# Patient Record
Sex: FEMALE | Race: WHITE | NOT HISPANIC OR LATINO
[De-identification: names, ages, dates, MRNs, and addresses within clinical notes are randomized per-mention and may not be internally consistent; named-entity substitution may affect disease eponyms.]

---

## 2019-05-14 ENCOUNTER — APPOINTMENT (OUTPATIENT)
Age: 44
End: 2019-05-14
Payer: COMMERCIAL

## 2019-05-14 ENCOUNTER — RESULT CHARGE (OUTPATIENT)
Age: 44
End: 2019-05-14

## 2019-05-14 VITALS
HEIGHT: 60 IN | BODY MASS INDEX: 32.2 KG/M2 | DIASTOLIC BLOOD PRESSURE: 74 MMHG | SYSTOLIC BLOOD PRESSURE: 114 MMHG | WEIGHT: 164 LBS

## 2019-05-14 DIAGNOSIS — Z86.39 PERSONAL HISTORY OF OTHER ENDOCRINE, NUTRITIONAL AND METABOLIC DISEASE: ICD-10-CM

## 2019-05-14 DIAGNOSIS — Z82.49 FAMILY HISTORY OF ISCHEMIC HEART DISEASE AND OTHER DISEASES OF THE CIRCULATORY SYSTEM: ICD-10-CM

## 2019-05-14 DIAGNOSIS — N30.90 CYSTITIS, UNSPECIFIED W/OUT HEMATURIA: ICD-10-CM

## 2019-05-14 DIAGNOSIS — Z84.1 FAMILY HISTORY OF DISORDERS OF KIDNEY AND URETER: ICD-10-CM

## 2019-05-14 DIAGNOSIS — R32 UNSPECIFIED URINARY INCONTINENCE: ICD-10-CM

## 2019-05-14 DIAGNOSIS — Z80.3 FAMILY HISTORY OF MALIGNANT NEOPLASM OF BREAST: ICD-10-CM

## 2019-05-14 DIAGNOSIS — R39.15 URGENCY OF URINATION: ICD-10-CM

## 2019-05-14 DIAGNOSIS — Z83.3 FAMILY HISTORY OF DIABETES MELLITUS: ICD-10-CM

## 2019-05-14 DIAGNOSIS — Z86.018 PERSONAL HISTORY OF OTHER BENIGN NEOPLASM: ICD-10-CM

## 2019-05-14 LAB
BILIRUB UR QL STRIP: NEGATIVE
CLARITY UR: CLEAR
COLLECTION METHOD: NORMAL
GLUCOSE UR-MCNC: 2
HCG UR QL: 0.2 EU/DL
HGB UR QL STRIP.AUTO: NEGATIVE
KETONES UR-MCNC: NEGATIVE
LEUKOCYTE ESTERASE UR QL STRIP: NEGATIVE
NITRITE UR QL STRIP: NEGATIVE
PH UR STRIP: 6
PROT UR STRIP-MCNC: NEGATIVE
SP GR UR STRIP: 1

## 2019-05-14 PROCEDURE — 99204 OFFICE O/P NEW MOD 45 MIN: CPT | Mod: 25

## 2019-05-14 PROCEDURE — 51701 INSERT BLADDER CATHETER: CPT

## 2019-05-14 PROCEDURE — 81003 URINALYSIS AUTO W/O SCOPE: CPT | Mod: QW

## 2019-05-14 RX ORDER — OXYBUTYNIN CHLORIDE 10 MG/1
10 TABLET, EXTENDED RELEASE ORAL
Qty: 30 | Refills: 1 | Status: ACTIVE | COMMUNITY
Start: 2019-05-14 | End: 1900-01-01

## 2019-05-14 NOTE — OB HISTORY
[Vaginal ___] : [unfilled] vaginal delivery(s) [Sexually Active] : is not sexually active [FreeTextEntry1] : largest baby 8 lbs 15 oz

## 2019-05-14 NOTE — CHIEF COMPLAINT
[Questionnaire Received] : Patient questionnaire received [Poor Bladder Control] : poor bladder control [Recurrent Urinary Infections] : recurrent urinary infections [Sexual Dysfunction] : sexual dysfunction

## 2019-05-14 NOTE — PHYSICAL EXAM
[Oriented x3] : oriented to person, place, and time [No Acute Distress] : in no acute distress [No Edema] : ~T edema was not present [Symmetrical] : the neck was ~L symmetrical [Warm and Dry] : was warm and dry to touch [Normal Gait] : gait was normal [Labia Majora] : were normal [Labia Minora] : were normal [Bartholin's Gland] : both Bartholin's glands were normal  [Normal Appearance] : general appearance was normal [2] : 2 [Aa ____] : Aa [unfilled] [Ba ____] : Ba [unfilled] [C ____] : C [unfilled] [GH ____] : GH [unfilled] [PB ____] : PB [unfilled] [TVL ____] : TVL  [unfilled] [Ap ____] : Ap [unfilled] [Bp ____] : Bp [unfilled] [D ____] : D [unfilled] [] : I [Normal] : normal [Soft] :  the cervix was soft [Post Void Residual ____ml] : post void residual via catheterization was [unfilled] ml [Exam Deferred] : was deferred [Cough] : no cough [Tenderness] : ~T no ~M abdominal tenderness observed [Distended] : not distended [FreeTextEntry3] : CST neg, +urethral hypermobility [Inguinal LAD] : no adenopathy was noted in the inguinal lymph nodes [FreeTextEntry4] : +menses, 1cm just left of midline distal redundant tissue vs simple appearing vaginal cyst, tender, no urethral discharge with squeeze [de-identified] : no appreciable masses [de-identified] : uroflow low volume void for interpretation

## 2019-05-14 NOTE — ASSESSMENT
[FreeTextEntry1] : Patient requested official  via [employer] for discussion - Lorena, #[phone] used.\par \par Susan is a pleasant 45 yo P4, PSHx includes BTL and C/S x 1 (for IUFD at term?), presents with reported hx of recurrent UTIs, OAB-wet and TAYLOR, and dyspareunia. On exam, her empty supine CST was neg, however she had positive urethral hypermobility. Her straight-cath PVR volume was elevated and neg for evidence of UTI, however there was glucose present which she was counseled represents poor DM control and should discuss with PMD. On pelvic exam, there were no appreciable masses or lesions. Along the distal anterior vaginal wall just left of midline suburethrally was a 1cm redundant epithelium vs small vaginal cyst present, she reported some tenderness diffusefly throughout the vagina however. Pelvic floor muscle contraction strength was present but weak. There was no levator or pelvic floor musculature banding, tightness, or point tenderness. POPQ exam did not demonstrate clinically significant pelvic organ prolapse.\par \par We reviewed TAYLOR vs OAB. The etiology of TAYLOR was discussed. Management options including observation, behavioral modifications, medication, pessary, Impressa insert, periurethral bulking via cystoscopy, and surgery with midurethral sling were reviewed. The etiology of OAB was discussed. Management options including observation, behavioral modifications (dietary changes, monitoring fluid intake, bladder training, timed voids, use of pads/protective garments), kegels, PT, medications, PTNS, SNS, and bladder Botox were all reviewed. She will try behavioral modifications - reducing dietary irritants, and Oxybutynin 10mg. SEs such as dry mouth and constipation reviewed. She will RTO in 1 month for PVR check and followup. If no response, I would recommend UDS to further eval JILLIAN at that time.\par \par We discussed reduced libido vs vaginal pathology that may lead to sexual dysfunction. Potential cyst discussed and I would like her to think about where her dyspareunia is and reexamine at next visit. Cysto to be considered to r/o urethral diverticulum component. She does not feel dry. I would like to recheck POPQ as well to look for cystocele component considering her symptoms and PVR of 100ml today. She understood and agreed. All questions answered.\par \par Plan:\par [] OAB behavioral modifications, avoid dietary irritants, total fluid amount normal, start oxybutynin 10mg\par [] recheck PVR and POPQ at next visit\par [] consider cysto (eval suburethral cyst) and UDS in future

## 2019-05-14 NOTE — PROCEDURE
[Straight Catheterization] : insertion of a straight catheter [Urinary Tract Infection] : a urinary tract infection [Urinary Retention] : urinary retention [Stress Incontinence] : stress incontinence [Urgent Incontinence] : urgent incontinence [Urinary Frequency] : urinary frequency [Patient] : the patient [___ Fr Straight Tip] : a [unfilled] in [place] straight tip catheter [None] : there were no complications with the catheter insertion [No Complications] : no complications [Clear] : clear [Post procedure instructions and information given] : Post procedure instructions and information were given and reviewed with patient. [Tolerated Well] : the patient tolerated the procedure well [0] : 0 [FreeTextEntry1] : catheterized to obtain uncontaminated specimen due to menses

## 2019-06-11 ENCOUNTER — APPOINTMENT (OUTPATIENT)
Dept: UROGYNECOLOGY | Facility: CLINIC | Age: 44
End: 2019-06-11

## 2020-04-04 ENCOUNTER — TRANSCRIPTION ENCOUNTER (OUTPATIENT)
Age: 45
End: 2020-04-04

## 2021-12-12 ENCOUNTER — EMERGENCY (EMERGENCY)
Facility: HOSPITAL | Age: 46
LOS: 1 days | Discharge: HOME | End: 2021-12-12
Attending: EMERGENCY MEDICINE
Payer: COMMERCIAL

## 2021-12-12 VITALS
OXYGEN SATURATION: 96 % | WEIGHT: 111.99 LBS | TEMPERATURE: 100 F | DIASTOLIC BLOOD PRESSURE: 84 MMHG | SYSTOLIC BLOOD PRESSURE: 128 MMHG | HEIGHT: 64 IN | HEART RATE: 115 BPM | RESPIRATION RATE: 20 BRPM

## 2021-12-12 LAB
ANION GAP SERPL CALC-SCNC: 14 MMOL/L — SIGNIFICANT CHANGE UP (ref 5–17)
BUN SERPL-MCNC: 10 MG/DL — SIGNIFICANT CHANGE UP (ref 8–20)
CALCIUM SERPL-MCNC: 8.4 MG/DL — LOW (ref 8.6–10.2)
CHLORIDE SERPL-SCNC: 97 MMOL/L — LOW (ref 98–107)
CO2 SERPL-SCNC: 22 MMOL/L — SIGNIFICANT CHANGE UP (ref 22–29)
CREAT SERPL-MCNC: 0.33 MG/DL — LOW (ref 0.5–1.3)
GLUCOSE SERPL-MCNC: 266 MG/DL — HIGH (ref 70–99)
HCT VFR BLD CALC: 43 % — SIGNIFICANT CHANGE UP (ref 34.5–45)
HGB BLD-MCNC: 14.7 G/DL — SIGNIFICANT CHANGE UP (ref 11.5–15.5)
HPIV3 RNA SPEC QL NAA+PROBE: DETECTED
MCHC RBC-ENTMCNC: 29.3 PG — SIGNIFICANT CHANGE UP (ref 27–34)
MCHC RBC-ENTMCNC: 34.2 GM/DL — SIGNIFICANT CHANGE UP (ref 32–36)
MCV RBC AUTO: 85.8 FL — SIGNIFICANT CHANGE UP (ref 80–100)
PLATELET # BLD AUTO: 295 K/UL — SIGNIFICANT CHANGE UP (ref 150–400)
POTASSIUM SERPL-MCNC: 4.3 MMOL/L — SIGNIFICANT CHANGE UP (ref 3.5–5.3)
POTASSIUM SERPL-SCNC: 4.3 MMOL/L — SIGNIFICANT CHANGE UP (ref 3.5–5.3)
RAPID RVP RESULT: DETECTED
RBC # BLD: 5.01 M/UL — SIGNIFICANT CHANGE UP (ref 3.8–5.2)
RBC # FLD: 12.3 % — SIGNIFICANT CHANGE UP (ref 10.3–14.5)
SARS-COV-2 RNA SPEC QL NAA+PROBE: SIGNIFICANT CHANGE UP
SODIUM SERPL-SCNC: 133 MMOL/L — LOW (ref 135–145)
WBC # BLD: 7.49 K/UL — SIGNIFICANT CHANGE UP (ref 3.8–10.5)
WBC # FLD AUTO: 7.49 K/UL — SIGNIFICANT CHANGE UP (ref 3.8–10.5)

## 2021-12-12 PROCEDURE — 36415 COLL VENOUS BLD VENIPUNCTURE: CPT

## 2021-12-12 PROCEDURE — 71046 X-RAY EXAM CHEST 2 VIEWS: CPT | Mod: 26

## 2021-12-12 PROCEDURE — 71046 X-RAY EXAM CHEST 2 VIEWS: CPT

## 2021-12-12 PROCEDURE — 96374 THER/PROPH/DIAG INJ IV PUSH: CPT

## 2021-12-12 PROCEDURE — 99284 EMERGENCY DEPT VISIT MOD MDM: CPT | Mod: 25

## 2021-12-12 PROCEDURE — 80048 BASIC METABOLIC PNL TOTAL CA: CPT

## 2021-12-12 PROCEDURE — 85027 COMPLETE CBC AUTOMATED: CPT

## 2021-12-12 PROCEDURE — 0225U NFCT DS DNA&RNA 21 SARSCOV2: CPT

## 2021-12-12 PROCEDURE — 99284 EMERGENCY DEPT VISIT MOD MDM: CPT

## 2021-12-12 RX ORDER — ACETAMINOPHEN 500 MG
650 TABLET ORAL ONCE
Refills: 0 | Status: COMPLETED | OUTPATIENT
Start: 2021-12-12 | End: 2021-12-12

## 2021-12-12 RX ORDER — SODIUM CHLORIDE 9 MG/ML
1000 INJECTION INTRAMUSCULAR; INTRAVENOUS; SUBCUTANEOUS ONCE
Refills: 0 | Status: COMPLETED | OUTPATIENT
Start: 2021-12-12 | End: 2021-12-12

## 2021-12-12 RX ORDER — DEXAMETHASONE 0.5 MG/5ML
10 ELIXIR ORAL ONCE
Refills: 0 | Status: COMPLETED | OUTPATIENT
Start: 2021-12-12 | End: 2021-12-12

## 2021-12-12 RX ADMIN — SODIUM CHLORIDE 1000 MILLILITER(S): 9 INJECTION INTRAMUSCULAR; INTRAVENOUS; SUBCUTANEOUS at 09:52

## 2021-12-12 RX ADMIN — Medication 650 MILLIGRAM(S): at 10:04

## 2021-12-12 RX ADMIN — Medication 102 MILLIGRAM(S): at 10:03

## 2021-12-12 RX ADMIN — Medication 100 MILLIGRAM(S): at 10:04

## 2021-12-12 NOTE — ED PROVIDER NOTE - ATTENDING CONTRIBUTION TO CARE
The patient seen and examined    Edwardo STARKEY, performed the initial face to face bedside interview with this patient regarding history of present illness, review of symptoms and relevant past medical, social and family history.  I completed an independent physical examination.  I was the initial provider who evaluated this patient. I have signed out the follow up of any pending tests (i.e. labs, radiological studies) to the ACP.  I have communicated the patient’s plan of care and disposition with the ACP.

## 2021-12-12 NOTE — ED PROVIDER NOTE - PHYSICAL EXAMINATION
PE- Well developed, well-nourish, resting comfortably in NAD.   Cardiac- + regular rate and rhythm.   Pulm- lungs clear bilaterally. One time wheeze of RUL which improved with cough. No distress.  Abd soft and non-tender. No guarding or rigidity.   Neuro- A&Ox3, no gross sensory deficits to light touch or motor weaknesses.   Vasc- No peripheral edema or venous stasis noted.  Skin- No ecchymosis or bleeding.  MS-  + NROM. Non-tender. No signs of trauma.

## 2021-12-12 NOTE — ED PROVIDER NOTE - CLINICAL SUMMARY MEDICAL DECISION MAKING FREE TEXT BOX
Will obtain CXR and labs. With flu and COVID testing negative in the pats 24 hrs, patient likely has viral cough. will hydrate and re-evaluate.

## 2021-12-12 NOTE — ED ADULT NURSE NOTE - OBJECTIVE STATEMENT
46 yof presents to ed with cough sore throat and pleuritic pain from coughing. denies fever/ c/o chills. body aches

## 2021-12-12 NOTE — ED PROVIDER NOTE - OBJECTIVE STATEMENT
Patient presented with son for evaluation of cough of chest and back discomfort. She has had a cough for a about one week. She was seen in the ED at [facility] yesterday and was tested for flu and COVID. Both were negative. The patient reports that she did not have any other testing. Patient reports of being COVID vaccinated. No flu vaccine yet. She reports of fatigue and HA with complaints. No fever or chills. No nausea or vomiting.

## 2021-12-12 NOTE — ED PROVIDER NOTE - NS ED ROS FT
Review of Systems-  Constitutional: No fever or chills.   Cardiovascular: No orthopnea or chest pain  Pulmonary: + cough No shortness of breath.   GI: No abdominal pain, nausea or vomiting  Musculoskeletal: No joint pain. No neck pain   Psychiatric: No anxiety and depression.

## 2021-12-12 NOTE — ED PROVIDER NOTE - PATIENT PORTAL LINK FT
You can access the FollowMyHealth Patient Portal offered by [facility] by registering at the following website: [email or web address]. By joining [employer]’s FollowMyHealth portal, you will also be able to view your health information using other applications (apps) compatible with our system.

## 2021-12-12 NOTE — ED PROVIDER NOTE - NSFOLLOWUPINSTRUCTIONS_ED_ALL_ED_FT
* TAKE ALL MEDICATIONS as directed.  ** Tessalon Pearles as directed **  * FOR PAIN YOU CAN TAKE IBUPROFEN (MOTRIN, ADVIL) OR ACETAMINOPHEN (TYLENOL) AS NEEDED, AS DIRECTED ON PACKAGING.  ** Ibuprofen can cause stomach discomfort. Discontinue immediately if this should develop.  * IF NEEDED, CALL [phone] TO FIND A PRIMARY CARE PHYSICIAN.  OR CALL [phone] TO MAKE AN APPOINTMENT WITH THE MEDICAL CLINIC.  *  RETURN TO THE ER FOR ANY WORSENING SYMPTOMS.    * Follow-up with primary care provider within 2-3 days for re-evaluation  * Reduce activities until improved.   * Increase daily water intake

## 2021-12-14 ENCOUNTER — TRANSCRIPTION ENCOUNTER (OUTPATIENT)
Age: 46
End: 2021-12-14

## 2022-04-05 PROBLEM — E11.9 TYPE 2 DIABETES MELLITUS WITHOUT COMPLICATIONS: Chronic | Status: ACTIVE | Noted: 2021-12-12

## 2022-06-06 ENCOUNTER — NON-APPOINTMENT (OUTPATIENT)
Age: 47
End: 2022-06-06

## 2022-07-07 NOTE — HISTORY OF PRESENT ILLNESS
[FreeTextEntry1] : CPE/new patient [de-identified] : Susan is a 47-year-old female with past medical history of

## 2022-07-08 ENCOUNTER — APPOINTMENT (OUTPATIENT)
Dept: INTERNAL MEDICINE | Facility: CLINIC | Age: 47
End: 2022-07-08

## 2023-04-13 ENCOUNTER — APPOINTMENT (OUTPATIENT)
Dept: GASTROENTEROLOGY | Facility: CLINIC | Age: 48
End: 2023-04-13
Payer: COMMERCIAL

## 2023-04-13 VITALS — HEART RATE: 98 BPM

## 2023-04-13 VITALS
BODY MASS INDEX: 28.13 KG/M2 | HEART RATE: 102 BPM | OXYGEN SATURATION: 98 % | HEIGHT: 61 IN | DIASTOLIC BLOOD PRESSURE: 90 MMHG | TEMPERATURE: 96.8 F | WEIGHT: 149 LBS | SYSTOLIC BLOOD PRESSURE: 139 MMHG

## 2023-04-13 DIAGNOSIS — K31.A0 GASTRIC INTESTINAL METAPLASIA, UNSPECIFIED: ICD-10-CM

## 2023-04-13 DIAGNOSIS — Z78.9 OTHER SPECIFIED HEALTH STATUS: ICD-10-CM

## 2023-04-13 PROCEDURE — 99204 OFFICE O/P NEW MOD 45 MIN: CPT

## 2023-04-13 NOTE — HISTORY OF PRESENT ILLNESS
[FreeTextEntry1] : 48 year old woman with hx of DM presents for second opinion.\par \par Patient reports she has had 9 months of left sided abdominal pain. Reports pain is constant, non-radiating and partially improved with a 'pill' that she was given. She underwent an endoscopy and colonoscopy with an outside provider. Colonoscopy was reportedly normal, but endoscopy showed gastric intestinal metaplasia (Unclear distribution). Patient denies fevers, chills, chest pain, SOB, nausea, vomiting, melena, hematochezia, hematemesis, dysphagia, odynophagia, headache, dizziness, abdominal pain and recent travel. \par \par Patient reports she is scheduled for a repeat endoscopy with further biopsies.

## 2023-04-13 NOTE — ASSESSMENT
[FreeTextEntry1] : Impression:\par # Gastric Intestinal Metaplasia\par \par Plan:\par - Advised for repeat endoscopy with mapping biopsies (reports this is already scheduled with outside provider)\par - Educated on details and natural history of gastric intestinal metaplasia.\par - Follow up PRN.

## 2023-04-13 NOTE — PHYSICAL EXAM
[Alert] : alert [Normal Voice/Communication] : normal voice/communication [Healthy Appearing] : healthy appearing [No Acute Distress] : no acute distress [Sclera] : the sclera and conjunctiva were normal [Hearing Threshold Finger Rub Not [place]] : hearing was normal [Normal Appearance] : the appearance of the neck was normal [No Respiratory Distress] : no respiratory distress [No Acc Muscle Use] : no accessory muscle use [Respiration, Rhythm And Depth] : normal respiratory rhythm and effort [Heart Rate And Rhythm] : heart rate was normal and rhythm regular [Normal S1, S2] : normal S1 and S2 [Murmurs] : no murmurs [Bowel Sounds] : normal bowel sounds [Abdomen Tenderness] : non-tender [No Masses] : no abdominal mass palpated [Abdomen Soft] : soft [Cervical Lymph Nodes Enlarged Posterior Bilaterally] : no posterior cervical lymphadenopathy [Cervical Lymph Nodes Enlarged Anterior Bilaterally] : no anterior cervical lymphadenopathy [No CVA Tenderness] : no CVA  tenderness [No Spinal Tenderness] : no spinal tenderness [Abnormal Walk] : normal gait [Involuntary Movements] : no involuntary movements were seen [Normal Color / Pigmentation] : normal skin color and pigmentation [Skin Lesions] : no skin lesions [No Focal Deficits] : no focal deficits [Motor Exam] : the motor exam was normal [Oriented To Time, Place, And Person] : oriented to person, place, and time [Normal Affect] : the affect was normal [Normal Mood] : the mood was normal

## 2023-04-13 NOTE — REVIEW OF SYSTEMS
[As Noted in HPI] : as noted in HPI [Feeling Poorly] : not feeling poorly [Feeling Tired] : not feeling tired [Red Eyes] : eyes not red [Scleral Icterus (Yellow Eyes)] : no scleral icterus [Sore Throat] : no sore throat [Hoarseness] : no hoarseness [Chest Pain] : no chest pain [Palpitations] : no palpitations [Lower Ext Edema (lower leg swelling)] : no lower extremity edema [Wheezing] : no wheezing [SOB on Exertion] : no shortness of breath during exertion [Arthralgias (joint pain)] : no arthralgias [Joint Stiffness] : no joint stiffness [Itching] : no itching [Jaundice (yellowing of skin)] : no jaundice [Dizziness] : no dizziness [Fainting] : no fainting [Suicidal] : not suicidal [Anxiety] : no anxiety [Easy Bleeding] : no tendency for easy bleeding [Easy Bruising] : no tendency for easy bruising

## 2023-07-31 ENCOUNTER — APPOINTMENT (OUTPATIENT)
Dept: NEUROSURGERY | Facility: CLINIC | Age: 48
End: 2023-07-31
Payer: COMMERCIAL

## 2023-07-31 ENCOUNTER — NON-APPOINTMENT (OUTPATIENT)
Age: 48
End: 2023-07-31

## 2023-07-31 VITALS
BODY MASS INDEX: 27.94 KG/M2 | SYSTOLIC BLOOD PRESSURE: 134 MMHG | OXYGEN SATURATION: 99 % | HEIGHT: 61 IN | HEART RATE: 92 BPM | DIASTOLIC BLOOD PRESSURE: 77 MMHG | WEIGHT: 148 LBS

## 2023-07-31 DIAGNOSIS — G89.29 LOW BACK PAIN, UNSPECIFIED: ICD-10-CM

## 2023-07-31 DIAGNOSIS — E78.00 PURE HYPERCHOLESTEROLEMIA, UNSPECIFIED: ICD-10-CM

## 2023-07-31 DIAGNOSIS — M54.50 LOW BACK PAIN, UNSPECIFIED: ICD-10-CM

## 2023-07-31 DIAGNOSIS — E11.9 TYPE 2 DIABETES MELLITUS W/OUT COMPLICATIONS: ICD-10-CM

## 2023-07-31 DIAGNOSIS — M54.16 RADICULOPATHY, LUMBAR REGION: ICD-10-CM

## 2023-07-31 PROCEDURE — 99203 OFFICE O/P NEW LOW 30 MIN: CPT

## 2023-07-31 RX ORDER — GABAPENTIN 300 MG/1
300 CAPSULE ORAL
Qty: 90 | Refills: 1 | Status: ACTIVE | COMMUNITY
Start: 2023-07-31 | End: 1900-01-01

## 2023-08-01 ENCOUNTER — APPOINTMENT (OUTPATIENT)
Dept: MRI IMAGING | Facility: CLINIC | Age: 48
End: 2023-08-01

## 2023-08-05 NOTE — ASSESSMENT
[FreeTextEntry1] : Ms. VIVIAN SWIFT is presenting Lumbar Spine The recommendation is for the following: Imaging evaluation shows evidence of   Plan: MRI Pelvis to the evaluation of sacral lesion/ pathology Hip  Xray to R/O Osteoarthritis Begin Physical Therapy for core strengthening.   Follow up after studies are completed. Please see Dr. Valdivia's dictation for details. I, Dr. Oneida Valdivia evaluated the patient with the nurse practitioner Harlan Huizar and established the plan of care. I personally discuss this patient with the nurse practitioner at the time of the visit. I agree with the assessment and plan as written, unless noted below.

## 2023-08-05 NOTE — HISTORY OF PRESENT ILLNESS
[FreeTextEntry1] : lower back pain [de-identified] : Ms. VIVIAN SWIFT is a 48-year woman presenting with a PMHx of DM, High Cholesterol   who presents for comprehensive neurosurgical evaluation of lumbar spine. Today she reports exacerbation of Right leg pain over the past three months. She reports that the pain is centrally located in the lowerback. She denies any numbness and tingling but reports right leg weakness. She says h/o falls over the past 2 years. She has had Ibuprofen and Lidocaine patches with minimal relief.

## 2023-08-21 ENCOUNTER — APPOINTMENT (OUTPATIENT)
Dept: NEUROSURGERY | Facility: CLINIC | Age: 48
End: 2023-08-21

## 2023-11-24 NOTE — HISTORY OF PRESENT ILLNESS
Flomax 0.4 mg oral capsule: 1 cap(s) orally once a day  omeprazole 20 mg oral delayed release capsule: orally 3 times a week  potassium citrate 5 mEq oral tablet, extended release: 1 tab(s) orally 2 times a day  rosuvastatin 5 mg oral tablet: 0.5  orally 3 times a week   [FreeTextEntry1] : About 1 year of bothersome urinary complaints - freq and urge, during day, UUI > TAYLOR. No hematuria, occasional dysuria, +incomplete emptying. NO bulge or pressure from vagina, however dyspareunia - unsure if deep vs insertional. Reports recurernt UTIs few/year, for 1-2 years, occasional dysuria or incomplete emptying but not UCx-proven, and not related to intercourse, symptoms do NOT respond to abx treatment. Regular monthly menses. Hx BTL, future fertility not desired, currently has IUD in for fibroid uterus / reeduction of heavy menses which helps. Normal bowel movements without straining. No pain. Reports avg fluid intake about 32 oz water and 20 oz coffee per day. Same sexual partner - , reduced libido for years. No DV, no trauma.\par \par Diabetic, likely not controlled, reports doesn't take her metformin routinely and FSG sometimes normal  and sometimes in 300s. apixaban 5 mg oral tablet: 2 tab(s) orally 2 times a day 2 tabs by mouth twice a day for 7 days, then DECREASE dose to 1 tab by mouth twice a day starting evening dose of 12/1/23.  Flomax 0.4 mg oral capsule: 1 cap(s) orally once a day  omeprazole 20 mg oral delayed release capsule: orally 3 times a week  potassium citrate 5 mEq oral tablet, extended release: 1 tab(s) orally 2 times a day  rosuvastatin 5 mg oral tablet: 0.5  orally 3 times a week

## 2024-02-14 ENCOUNTER — APPOINTMENT (OUTPATIENT)
Dept: OBGYN | Facility: CLINIC | Age: 49
End: 2024-02-14
Payer: COMMERCIAL

## 2024-02-14 VITALS
BODY MASS INDEX: 29.25 KG/M2 | DIASTOLIC BLOOD PRESSURE: 72 MMHG | SYSTOLIC BLOOD PRESSURE: 120 MMHG | WEIGHT: 149 LBS | HEIGHT: 60 IN

## 2024-02-14 DIAGNOSIS — R10.2 PELVIC AND PERINEAL PAIN: ICD-10-CM

## 2024-02-14 DIAGNOSIS — Z01.411 ENCOUNTER FOR GYNECOLOGICAL EXAMINATION (GENERAL) (ROUTINE) WITH ABNORMAL FINDINGS: ICD-10-CM

## 2024-02-14 PROCEDURE — 99386 PREV VISIT NEW AGE 40-64: CPT

## 2024-02-14 NOTE — PLAN
[FreeTextEntry1] : Encounter for GYN exam   - PAP obtained   Female pelvic pain   - US ordered   F/U in 2 weeks to discuss results  All questions and concerns addressed during encounter. Pt. agreed to plan of care.

## 2024-02-14 NOTE — HISTORY OF PRESENT ILLNESS
[N] : Patient is not sexually active [Regular Cycle Intervals] : periods have been regular [Frequency: Q ___ days] : menstrual periods occur approximately every [unfilled] days [Menarche Age: ____] : age at menarche was [unfilled] [Y] : Patient uses contraception [FreeTextEntry1] : 49 year old female presents for annual examination. Pt. sates she has very heavy menstrual cycles that are painful. but states they are regular endorses history of uterine fibroids, complains of pulsating pelvic pain. . No complaints of hot flashes. Pt. had mammogram performed today.  [PGHxTotal] : 5 [[facility] : 4 [PGHxPremature] : 0 [PGHxAbortions] : 1 [[facility]] : 4 [PGHxABInduced] : 0 [PGHxABSpont] : 1 [PGHxEctopic] : 0 [PGHxMultBirths] : 0

## 2024-02-16 LAB — HPV HIGH+LOW RISK DNA PNL CVX: NOT DETECTED

## 2024-02-16 RX ORDER — METFORMIN HYDROCHLORIDE 500 MG/1
500 TABLET, COATED ORAL
Refills: 0 | Status: ACTIVE | COMMUNITY

## 2024-02-16 RX ORDER — GLIPIZIDE 2.5 MG/1
TABLET ORAL
Refills: 0 | Status: ACTIVE | COMMUNITY

## 2024-02-16 RX ORDER — ATORVASTATIN CALCIUM 80 MG/1
TABLET, FILM COATED ORAL
Refills: 0 | Status: ACTIVE | COMMUNITY

## 2024-02-20 LAB — CYTOLOGY CVX/VAG DOC THIN PREP: NORMAL

## 2024-02-27 ENCOUNTER — APPOINTMENT (OUTPATIENT)
Dept: ULTRASOUND IMAGING | Facility: CLINIC | Age: 49
End: 2024-02-27
Payer: COMMERCIAL

## 2024-02-27 ENCOUNTER — OUTPATIENT (OUTPATIENT)
Dept: OUTPATIENT SERVICES | Facility: HOSPITAL | Age: 49
LOS: 1 days | End: 2024-02-27

## 2024-02-27 PROCEDURE — 76830 TRANSVAGINAL US NON-OB: CPT | Mod: 26

## 2024-02-27 PROCEDURE — 76856 US EXAM PELVIC COMPLETE: CPT | Mod: 26

## 2024-03-25 ENCOUNTER — APPOINTMENT (OUTPATIENT)
Dept: OBGYN | Facility: CLINIC | Age: 49
End: 2024-03-25
Payer: COMMERCIAL

## 2024-03-25 VITALS
SYSTOLIC BLOOD PRESSURE: 120 MMHG | BODY MASS INDEX: 29.25 KG/M2 | DIASTOLIC BLOOD PRESSURE: 70 MMHG | WEIGHT: 149 LBS | HEIGHT: 60 IN

## 2024-03-25 DIAGNOSIS — R10.2 PELVIC AND PERINEAL PAIN: ICD-10-CM

## 2024-03-25 DIAGNOSIS — N92.0 EXCESSIVE AND FREQUENT MENSTRUATION WITH REGULAR CYCLE: ICD-10-CM

## 2024-03-25 PROCEDURE — 99213 OFFICE O/P EST LOW 20 MIN: CPT

## 2024-03-27 RX ORDER — TRIAMCINOLONE ACETONIDE 1 MG/G
0.1 CREAM TOPICAL
Qty: 45 | Refills: 0 | Status: ACTIVE | COMMUNITY
Start: 2024-02-14

## 2024-03-27 RX ORDER — GLIMEPIRIDE 1 MG/1
1 TABLET ORAL
Qty: 60 | Refills: 0 | Status: ACTIVE | COMMUNITY
Start: 2024-02-15

## 2024-03-27 RX ORDER — OSELTAMIVIR PHOSPHATE 75 MG/1
75 CAPSULE ORAL
Qty: 10 | Refills: 0 | Status: ACTIVE | COMMUNITY
Start: 2023-12-15

## 2024-03-27 RX ORDER — METFORMIN ER 500 MG 500 MG/1
500 TABLET ORAL
Qty: 30 | Refills: 0 | Status: ACTIVE | COMMUNITY
Start: 2023-10-31

## 2024-03-27 RX ORDER — LANCETS 33 GAUGE
EACH MISCELLANEOUS
Qty: 100 | Refills: 0 | Status: ACTIVE | COMMUNITY
Start: 2023-01-26

## 2024-03-27 RX ORDER — ATORVASTATIN CALCIUM 20 MG/1
20 TABLET, FILM COATED ORAL
Qty: 30 | Refills: 0 | Status: ACTIVE | COMMUNITY
Start: 2023-10-31

## 2024-03-27 RX ORDER — ATORVASTATIN CALCIUM 40 MG/1
40 TABLET, FILM COATED ORAL
Qty: 30 | Refills: 0 | Status: ACTIVE | COMMUNITY
Start: 2024-02-15

## 2024-04-04 ENCOUNTER — APPOINTMENT (OUTPATIENT)
Dept: MRI IMAGING | Facility: CLINIC | Age: 49
End: 2024-04-04

## 2024-04-05 NOTE — PLAN
[FreeTextEntry1] : Menorrhagia/Pelvic pain   - We discussed the results of the sonogram in detail. I spoke to the radiologist regarding the ? essure device who then stated after further clarification and confirmation of surgical history. Pt. confirmed tubal ligation, they feel as though it is  the tube with sutures. Pt. was informed of her results and with further review no longer recommend pelvic us   F/U PRN. Pt. was informed of several treatment options but declined treatment at this time.  All questions and concerns addressed during encounter. Pt. agreed to plan of care.

## 2024-05-09 ENCOUNTER — APPOINTMENT (OUTPATIENT)
Dept: DERMATOLOGY | Facility: CLINIC | Age: 49
End: 2024-05-09

## 2024-05-28 ENCOUNTER — APPOINTMENT (OUTPATIENT)
Dept: ORTHOPEDIC SURGERY | Facility: CLINIC | Age: 49
End: 2024-05-28
Payer: COMMERCIAL

## 2024-05-28 VITALS
DIASTOLIC BLOOD PRESSURE: 80 MMHG | HEIGHT: 60 IN | BODY MASS INDEX: 29.25 KG/M2 | HEART RATE: 93 BPM | SYSTOLIC BLOOD PRESSURE: 130 MMHG | WEIGHT: 149 LBS

## 2024-05-28 DIAGNOSIS — M54.2 CERVICALGIA: ICD-10-CM

## 2024-05-28 DIAGNOSIS — G56.02 CARPAL TUNNEL SYNDROME, LEFT UPPER LIMB: ICD-10-CM

## 2024-05-28 DIAGNOSIS — G56.01 CARPAL TUNNEL SYNDROME, RIGHT UPPER LIMB: ICD-10-CM

## 2024-05-28 DIAGNOSIS — M54.12 RADICULOPATHY, CERVICAL REGION: ICD-10-CM

## 2024-05-28 DIAGNOSIS — M75.101 UNSPECIFIED ROTATOR CUFF TEAR OR RUPTURE OF RIGHT SHOULDER, NOT SPECIFIED AS TRAUMATIC: ICD-10-CM

## 2024-05-28 PROCEDURE — 99204 OFFICE O/P NEW MOD 45 MIN: CPT

## 2024-05-28 PROCEDURE — 72040 X-RAY EXAM NECK SPINE 2-3 VW: CPT

## 2024-05-28 PROCEDURE — 73030 X-RAY EXAM OF SHOULDER: CPT | Mod: RT

## 2024-05-30 NOTE — PHYSICAL EXAM
[de-identified] : Physical Exam:  General: Well appearing, no acute distress  Neurologic: A&Ox3, No focal deficits  Head: NCAT without abrasions, lacerations, or ecchymosis to head, face, or scalp  Eyes: No scleral icterus, no gross abnormalities  Respiratory: Equal chest wall expansion bilaterally, no accessory muscle use  Lymphatic: No lymphadenopathy palpated  Skin: Warm and dry  Psychiatric: Normal mood and affect  Examination of the Right shoulder shows no obvious deformity, swelling or erythema. Mild tenderness to palpation over the anterior shoulder. No AC joint tenderness. The patient demonstrates active/passive ROM of Forward Flexion to 155 degrees, External Rotation to 60 degrees and Internal Rotation to a mid lumbar level. The patient has a positive Fournier and Neers test. No pain with cross body adduction, lift off testing, AC compression testing or Yergason testing. The patient has 4/5 strength to forward flexion with pronation, internal and external rotation. Compartments are soft and nontender. The patient has 2+ cap refill and sensation is intact in the hand.   Left shoulder shows no deformity. No tenderness to palpation over the biceps or AC joint. The patient has Forward Flexion to 170 degrees, External Rotation to 45 degrees and Internal Rotation to a mid lumbar level. 5/5 strength to forward flexion with pronation, internal and external rotation. Compartments are soft and nontender. 2+ cap refill. Sensation intact distally.   Bilateral hands and elbows were examined and reveal no atrophy.  There is no deformity.  There is positive Durkan's test at the bilateral carpal tunnels.  Negative Tinel signs at the cubital tunnels and carpal tunnels.  There is good  strength and 2+ capillary refill.  Sensations intact in both hands. [de-identified] : X-rays including 4 views of the right shoulder taken today in the office are reviewed.  These reveal no fractures or acute bony injuries.  2 views of the cervical spine were performed today and available for me to review. Results were discussed with the patient. They demonstrate no acute fracture or deformity.   An MRI performed at [facility] from 2021 was reviewed, no report is available but upon my review of the MRI there is a small joint effusion as well as high-grade rotator cuff tear with possible small full-thickness area.  There is evidence of mild biceps tendinitis as well.

## 2024-05-30 NOTE — DISCUSSION/SUMMARY
[de-identified] : I had a lengthy discussion with the patient regarding their current condition. We discussed the treatment options including operative and nonoperative management. At this time I recommended a new MRI of her right shoulder.  She is considering surgical intervention and is failed physical therapy and injections in the past.  She is frustrated with her continued pain.  In terms of her carpal tunnel syndrome I referred her to one of our hand specialist colleagues.  All questions were answered.  She will follow-up after the MRI.
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
## 2024-06-06 ENCOUNTER — APPOINTMENT (OUTPATIENT)
Dept: DERMATOLOGY | Facility: CLINIC | Age: 49
End: 2024-06-06

## 2024-06-10 ENCOUNTER — OUTPATIENT (OUTPATIENT)
Dept: OUTPATIENT SERVICES | Facility: HOSPITAL | Age: 49
LOS: 1 days | End: 2024-06-10

## 2024-06-10 ENCOUNTER — APPOINTMENT (OUTPATIENT)
Dept: MRI IMAGING | Facility: CLINIC | Age: 49
End: 2024-06-10

## 2024-06-10 DIAGNOSIS — M75.101 UNSPECIFIED ROTATOR CUFF TEAR OR RUPTURE OF RIGHT SHOULDER, NOT SPECIFIED AS TRAUMATIC: ICD-10-CM

## 2024-06-10 PROCEDURE — 73221 MRI JOINT UPR EXTREM W/O DYE: CPT | Mod: 26,RT

## 2024-06-18 ENCOUNTER — APPOINTMENT (OUTPATIENT)
Dept: DERMATOLOGY | Facility: CLINIC | Age: 49
End: 2024-06-18
Payer: COMMERCIAL

## 2024-06-18 DIAGNOSIS — L64.9 ANDROGENIC ALOPECIA, UNSPECIFIED: ICD-10-CM

## 2024-06-18 DIAGNOSIS — L65.9 NONSCARRING HAIR LOSS, UNSPECIFIED: ICD-10-CM

## 2024-06-18 PROCEDURE — 99204 OFFICE O/P NEW MOD 45 MIN: CPT

## 2024-06-18 RX ORDER — CICLOPIROX 10 MG/.96ML
1 SHAMPOO TOPICAL
Qty: 1 | Refills: 10 | Status: ACTIVE | COMMUNITY
Start: 2024-06-18 | End: 1900-01-01

## 2024-06-18 RX ORDER — MOMETASONE FUROATE 1 MG/ML
0.1 SOLUTION TOPICAL
Qty: 1 | Refills: 6 | Status: ACTIVE | COMMUNITY
Start: 2024-06-18 | End: 1900-01-01

## 2024-06-18 NOTE — ASSESSMENT
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 AGA requires medical intervention lifelong to maintain results. Likely will need to try any of the discussed interventions for at least 4-6 months, if not longer, to see effects. - Discussed management options and goals of treatment, including topical minoxidil and systemic therapy. ILK not warranted at this time as no inflammation. Could consider PRP but discussed may not be efficacious and out of pocket cost - Start minoxidil 5% foam or solution to affected areas 1-2 times daily otc. Proper medication use and side effects discussed, including unwanted hair growth in unintended treated areas. Advised that medication should be continued to maintain any clinical effects. Counseled best results around 4 months and may have shedding around 2 weeks, which can last 4-6 weeks. Solution may work better than foam, but propylene glycol in solution may be irritating. - start mometasone solution to scalp daily x 2 weeks, 1 week off, SED, do not get on face - will check TSH, ferritin, Vit D  RTC 6 months, or earlier prn for biopsy

## 2024-06-18 NOTE — HISTORY OF PRESENT ILLNESS
[FreeTextEntry1] : NPV- hair loss [de-identified] : Jun 18 2024  1:30PM   49 year F new patient here for evaluation of hair loss over whole scalp x 1 year. Has been falling out much more quickly as of late. Sometimes itchy, but no burning or pain. Denies known illness, surgery, pregnancy, stress prior to that, last hospitalization in 2019. Has tried vitamins otc and shampoos from salon, rosemary oil, topical vitamins. Son with alopecia areata  PMH: HLD, diabetes All: NKDA No personal or family hx of skin cancer

## 2024-06-18 NOTE — PHYSICAL EXAM
[FreeTextEntry3] : General: well appearing person in nad, alert, pleasant Focused Skin Exam per patient preference: Scalp with widened central part, decreased general hair density, rey frontotemporal Dermoscopy with minimal erythema, no scale; +variation in hair shaft diameter, some background loss of ostia, no perifollicular fibrosis eyebrows with thinning laterally negative hair pull test

## 2024-06-25 ENCOUNTER — TRANSCRIPTION ENCOUNTER (OUTPATIENT)
Age: 49
End: 2024-06-25

## 2024-07-11 ENCOUNTER — APPOINTMENT (OUTPATIENT)
Dept: MRI IMAGING | Facility: CLINIC | Age: 49
End: 2024-07-11
Payer: COMMERCIAL

## 2024-07-11 ENCOUNTER — RESULT REVIEW (OUTPATIENT)
Age: 49
End: 2024-07-11

## 2024-07-11 ENCOUNTER — OUTPATIENT (OUTPATIENT)
Dept: OUTPATIENT SERVICES | Facility: HOSPITAL | Age: 49
LOS: 1 days | End: 2024-07-11

## 2024-07-11 DIAGNOSIS — Z00.8 ENCOUNTER FOR OTHER GENERAL EXAMINATION: ICD-10-CM

## 2024-07-11 PROCEDURE — 72141 MRI NECK SPINE W/O DYE: CPT | Mod: 26

## 2024-09-16 RX ORDER — KETOCONAZOLE 20.5 MG/ML
2 SHAMPOO, SUSPENSION TOPICAL
Qty: 1 | Refills: 3 | Status: ACTIVE | COMMUNITY
Start: 2024-09-16 | End: 1900-01-01

## 2024-10-23 ENCOUNTER — EMERGENCY (EMERGENCY)
Facility: HOSPITAL | Age: 49
LOS: 1 days | Discharge: HOME | End: 2024-10-23
Attending: STUDENT IN AN ORGANIZED HEALTH CARE EDUCATION/TRAINING PROGRAM | Admitting: STUDENT IN AN ORGANIZED HEALTH CARE EDUCATION/TRAINING PROGRAM
Payer: COMMERCIAL

## 2024-10-23 VITALS
RESPIRATION RATE: 16 BRPM | DIASTOLIC BLOOD PRESSURE: 69 MMHG | HEART RATE: 98 BPM | OXYGEN SATURATION: 97 % | SYSTOLIC BLOOD PRESSURE: 105 MMHG | TEMPERATURE: 98 F

## 2024-10-23 VITALS
OXYGEN SATURATION: 98 % | RESPIRATION RATE: 16 BRPM | TEMPERATURE: 98 F | HEART RATE: 105 BPM | SYSTOLIC BLOOD PRESSURE: 130 MMHG | WEIGHT: 153 LBS | HEIGHT: 60 IN | DIASTOLIC BLOOD PRESSURE: 82 MMHG

## 2024-10-23 LAB
ALBUMIN SERPL ELPH-MCNC: 3.6 G/DL — SIGNIFICANT CHANGE UP (ref 3.3–5)
ALP SERPL-CCNC: 124 U/L — HIGH (ref 30–120)
ALT FLD-CCNC: 38 U/L — SIGNIFICANT CHANGE UP (ref 10–60)
ANION GAP SERPL CALC-SCNC: 11 MMOL/L — SIGNIFICANT CHANGE UP (ref 5–17)
APPEARANCE UR: CLEAR — SIGNIFICANT CHANGE UP
AST SERPL-CCNC: 18 U/L — SIGNIFICANT CHANGE UP (ref 10–40)
BASOPHILS # BLD AUTO: 0.03 K/UL — SIGNIFICANT CHANGE UP (ref 0–0.2)
BASOPHILS NFR BLD AUTO: 0.4 % — SIGNIFICANT CHANGE UP (ref 0–2)
BILIRUB SERPL-MCNC: 0.6 MG/DL — SIGNIFICANT CHANGE UP (ref 0.2–1.2)
BILIRUB UR-MCNC: NEGATIVE — SIGNIFICANT CHANGE UP
BUN SERPL-MCNC: 10 MG/DL — SIGNIFICANT CHANGE UP (ref 7–23)
CALCIUM SERPL-MCNC: 9.1 MG/DL — SIGNIFICANT CHANGE UP (ref 8.4–10.5)
CHLORIDE SERPL-SCNC: 96 MMOL/L — SIGNIFICANT CHANGE UP (ref 96–108)
CO2 SERPL-SCNC: 26 MMOL/L — SIGNIFICANT CHANGE UP (ref 22–31)
COLOR SPEC: YELLOW — SIGNIFICANT CHANGE UP
CREAT SERPL-MCNC: 0.68 MG/DL — SIGNIFICANT CHANGE UP (ref 0.5–1.3)
D DIMER BLD IA.RAPID-MCNC: <150 NG/ML DDU — SIGNIFICANT CHANGE UP
DIFF PNL FLD: NEGATIVE — SIGNIFICANT CHANGE UP
EGFR: 107 ML/MIN/1.73M2 — SIGNIFICANT CHANGE UP
EOSINOPHIL # BLD AUTO: 0.26 K/UL — SIGNIFICANT CHANGE UP (ref 0–0.5)
EOSINOPHIL NFR BLD AUTO: 3.9 % — SIGNIFICANT CHANGE UP (ref 0–6)
FLUAV AG NPH QL: SIGNIFICANT CHANGE UP
FLUBV AG NPH QL: SIGNIFICANT CHANGE UP
GLUCOSE SERPL-MCNC: 204 MG/DL — HIGH (ref 70–99)
GLUCOSE UR QL: 100 MG/DL
HCG UR QL: NEGATIVE — SIGNIFICANT CHANGE UP
HCT VFR BLD CALC: 41 % — SIGNIFICANT CHANGE UP (ref 34.5–45)
HGB BLD-MCNC: 13.9 G/DL — SIGNIFICANT CHANGE UP (ref 11.5–15.5)
IMM GRANULOCYTES NFR BLD AUTO: 0.4 % — SIGNIFICANT CHANGE UP (ref 0–0.9)
KETONES UR-MCNC: NEGATIVE MG/DL — SIGNIFICANT CHANGE UP
LEUKOCYTE ESTERASE UR-ACNC: NEGATIVE — SIGNIFICANT CHANGE UP
LYMPHOCYTES # BLD AUTO: 1.13 K/UL — SIGNIFICANT CHANGE UP (ref 1–3.3)
LYMPHOCYTES # BLD AUTO: 16.9 % — SIGNIFICANT CHANGE UP (ref 13–44)
MCHC RBC-ENTMCNC: 28.8 PG — SIGNIFICANT CHANGE UP (ref 27–34)
MCHC RBC-ENTMCNC: 33.9 G/DL — SIGNIFICANT CHANGE UP (ref 32–36)
MCV RBC AUTO: 85.1 FL — SIGNIFICANT CHANGE UP (ref 80–100)
MONOCYTES # BLD AUTO: 0.59 K/UL — SIGNIFICANT CHANGE UP (ref 0–0.9)
MONOCYTES NFR BLD AUTO: 8.8 % — SIGNIFICANT CHANGE UP (ref 2–14)
NEUTROPHILS # BLD AUTO: 4.64 K/UL — SIGNIFICANT CHANGE UP (ref 1.8–7.4)
NEUTROPHILS NFR BLD AUTO: 69.6 % — SIGNIFICANT CHANGE UP (ref 43–77)
NITRITE UR-MCNC: NEGATIVE — SIGNIFICANT CHANGE UP
NRBC # BLD: 0 /100 WBCS — SIGNIFICANT CHANGE UP (ref 0–0)
PH UR: 7 — SIGNIFICANT CHANGE UP (ref 5–8)
PLATELET # BLD AUTO: 269 K/UL — SIGNIFICANT CHANGE UP (ref 150–400)
POTASSIUM SERPL-MCNC: 3.9 MMOL/L — SIGNIFICANT CHANGE UP (ref 3.5–5.3)
POTASSIUM SERPL-SCNC: 3.9 MMOL/L — SIGNIFICANT CHANGE UP (ref 3.5–5.3)
PROT SERPL-MCNC: 7.3 G/DL — SIGNIFICANT CHANGE UP (ref 6–8.3)
PROT UR-MCNC: NEGATIVE MG/DL — SIGNIFICANT CHANGE UP
RBC # BLD: 4.82 M/UL — SIGNIFICANT CHANGE UP (ref 3.8–5.2)
RBC # FLD: 12.5 % — SIGNIFICANT CHANGE UP (ref 10.3–14.5)
RSV RNA NPH QL NAA+NON-PROBE: SIGNIFICANT CHANGE UP
SARS-COV-2 RNA SPEC QL NAA+PROBE: SIGNIFICANT CHANGE UP
SODIUM SERPL-SCNC: 133 MMOL/L — LOW (ref 135–145)
SP GR SPEC: 1.01 — SIGNIFICANT CHANGE UP (ref 1–1.03)
TROPONIN I, HIGH SENSITIVITY RESULT: <4 NG/L — SIGNIFICANT CHANGE UP
UROBILINOGEN FLD QL: 0.2 MG/DL — SIGNIFICANT CHANGE UP (ref 0.2–1)
WBC # BLD: 6.68 K/UL — SIGNIFICANT CHANGE UP (ref 3.8–10.5)
WBC # FLD AUTO: 6.68 K/UL — SIGNIFICANT CHANGE UP (ref 3.8–10.5)

## 2024-10-23 PROCEDURE — 96374 THER/PROPH/DIAG INJ IV PUSH: CPT

## 2024-10-23 PROCEDURE — 80053 COMPREHEN METABOLIC PANEL: CPT

## 2024-10-23 PROCEDURE — 71045 X-RAY EXAM CHEST 1 VIEW: CPT | Mod: 26

## 2024-10-23 PROCEDURE — 84484 ASSAY OF TROPONIN QUANT: CPT

## 2024-10-23 PROCEDURE — 85025 COMPLETE CBC W/AUTO DIFF WBC: CPT

## 2024-10-23 PROCEDURE — 99285 EMERGENCY DEPT VISIT HI MDM: CPT

## 2024-10-23 PROCEDURE — 99285 EMERGENCY DEPT VISIT HI MDM: CPT | Mod: 25

## 2024-10-23 PROCEDURE — 36415 COLL VENOUS BLD VENIPUNCTURE: CPT

## 2024-10-23 PROCEDURE — 81003 URINALYSIS AUTO W/O SCOPE: CPT

## 2024-10-23 PROCEDURE — 85379 FIBRIN DEGRADATION QUANT: CPT

## 2024-10-23 PROCEDURE — 93010 ELECTROCARDIOGRAM REPORT: CPT

## 2024-10-23 PROCEDURE — 81025 URINE PREGNANCY TEST: CPT

## 2024-10-23 PROCEDURE — 93005 ELECTROCARDIOGRAM TRACING: CPT

## 2024-10-23 PROCEDURE — 87637 SARSCOV2&INF A&B&RSV AMP PRB: CPT

## 2024-10-23 PROCEDURE — 71045 X-RAY EXAM CHEST 1 VIEW: CPT

## 2024-10-23 RX ORDER — ACETAMINOPHEN 325 MG
1000 TABLET ORAL ONCE
Refills: 0 | Status: COMPLETED | OUTPATIENT
Start: 2024-10-23 | End: 2024-10-23

## 2024-10-23 RX ADMIN — Medication 1000 MILLIGRAM(S): at 12:37

## 2024-10-23 RX ADMIN — Medication 400 MILLIGRAM(S): at 11:37

## 2024-10-23 NOTE — ED PROVIDER NOTE - ATTENDING APP SHARED VISIT CONTRIBUTION OF CARE
Vikash ATTG See MDM I performed a history and physical exam of the patient and discussed their management with the Physician assistant reviewed the PAs note and agree with the documented findings and plan of care. My medical decision making and observations are found above.

## 2024-10-23 NOTE — ED ADULT TRIAGE NOTE - CHIEF COMPLAINT QUOTE
Patient comes in with chest pain radiating to left arm, neck, and lower back. Symptoms began this morning. Denies additional complaints.

## 2024-10-23 NOTE — ED PROVIDER NOTE - CARE PROVIDER_API CALL
Shelby Matthew  Cardiology  [address], [place] [address]  Phone: [phone]  Fax: [phone]  Follow Up Time: 1-3 Days

## 2024-10-23 NOTE — ED PROVIDER NOTE - CLINICAL SUMMARY MEDICAL DECISION MAKING FREE TEXT BOX
Vikash ATTG See MDM I performed a history and physical exam of the patient and discussed their management with the Physician assistant reviewed the PAs note and agree with the documented findings and plan of care. My medical decision making and observations are found above.    will obtain labs w/ likely dc home given story low risk chest pain

## 2024-10-23 NOTE — ED PROVIDER NOTE - WR ORDER ID 1
56 yo F s/p above procedure    f/u CT read  c/w abx, vanc, meropenem (end date 1/30), colistin (end date 1/30)  benadryl prn for itching  mechanical soft diet/jtube feeds  incentive spirometer  oob, scds, lovenox  ppi  nausea control prn  pain control prn  aggressive PT  Dispo planning: MICHELE [id number]

## 2024-10-23 NOTE — ED PROVIDER NOTE - OBJECTIVE STATEMENT
49-year-old female with history of diabetes mellitus and hyperlipidemia presents with left-sided chest pain that started this morning around 12:30 AM.  Patient states she woke up to use the restroom and felt pressure in the left side of her chest.  Also reports some pain to left shoulder, neck, and lower back.  Took some Tylenol at 2 AM with some mild improvement of pain.  No shortness of breath.  No nausea or vomiting.  No calf pain or swelling.  No history of DVT or PE.  Pain worse with palpation and movement.  Patient was seen by cardiologist last month and had a negative nuclear stress test and echo on September 12 (showed trace mitral regurgitation but otherwise unremarkable per son)  BRODY Matthew

## 2024-10-23 NOTE — ED PROVIDER NOTE - NSFOLLOWUPINSTRUCTIONS_ED_ALL_ED_FT
Recommend Tylenol over-the-counter as needed for pain  Follow-up with primary care doctor and cardiology    Chest Pain    WHAT YOU NEED TO KNOW:    Chest pain can be caused by a range of conditions, from not serious to life-threatening. Chest pain can be a symptom of a digestive problem, such as acid reflux or a stomach ulcer. An anxiety attack or a strong emotion, such as anger, can also cause chest pain. Infection, inflammation, or a fracture in the bones or cartilage in your chest can cause pain or discomfort. Sometimes chest pain or pressure is caused by poor blood flow to your heart (angina). Chest pain may also be caused by life-threatening conditions such as a heart attack or blood clot in your lungs.    DISCHARGE INSTRUCTIONS:    Call your local emergency number (911 in the [place]) or have someone call if:    You have any of the following signs of a heart attack:  Squeezing, pressure, or pain in your chest    You may also have any of the following:  Discomfort or pain in your back, neck, jaw, stomach, or arm    Shortness of breath    Nausea or vomiting    Lightheadedness or a sudden cold sweat    Return to the emergency department if:    You have chest discomfort that gets worse, even with medicine.    You cough or vomit blood.    Your bowel movements are black or bloody.    You cannot stop vomiting, or it hurts to swallow.  Call your doctor if:    You have questions or concerns about your condition or care.    Medicines:    Medicines may be given to treat the cause of your chest pain. Examples include pain medicine, anxiety medicine, or medicines to increase blood flow to your heart.    Do not take certain medicines without asking your healthcare provider first. These include NSAIDs, herbal or vitamin supplements, and hormones, such as estrogen or progestin.    Take your medicine as directed. Contact your healthcare provider if you think your medicine is not helping or if you have side effects. Tell your provider if you are allergic to any medicine. Keep a list of the medicines, vitamins, and herbs you take. Include the amounts, and when and why you take them. Bring the list or the pill bottles to follow-up visits. Carry your medicine list with you in case of an emergency.  Healthy living tips: If the cause of your chest pain is known, your healthcare provider will give you specific guidelines to follow. The following are general healthy guidelines:    Do not smoke. Nicotine and other chemicals in cigarettes and cigars can cause lung and heart damage. Ask your healthcare provider for information if you currently smoke and need help to quit. E-cigarettes or smokeless tobacco still contain nicotine. Talk to your healthcare provider before you use these products.    Choose a variety of healthy foods as often as possible. Include fresh, frozen, or canned fruits and vegetables. Also include low-fat dairy products, fish, chicken (without skin), and lean meats. Your healthcare provider or a dietitian can help you create meal plans. You may need to avoid certain foods or drinks if your pain is caused by a digestion problem.  Healthy Foods      Lower your sodium (salt) intake. Limit foods that are high in sodium, such as canned foods, salty snacks, and cold cuts. If you add salt when you cook food, do not add more at the table. Choose low-sodium canned foods as much as possible.        Drink plenty of water every day. Water helps your body to control your temperature and blood pressure. Ask your healthcare provider how much water you should drink every day.    Ask about activity. Your healthcare provider will tell you which activities to limit or avoid. Ask when you can drive, return to work, and have sex. Ask about the best exercise plan for you.    Maintain a healthy weight. Ask your healthcare provider what a healthy weight is for you. Ask him or her to help you create a safe weight loss plan if you are overweight.    Ask about vaccines you may need. Your healthcare provider can tell you which vaccines you need, and when to get them. The following vaccines help prevent diseases that can become serious for a person with a heart condition:  The influenza (flu) vaccine is given each year. Get a flu vaccine as soon as recommended, usually in September or October.    The pneumonia vaccine is usually given every 5 years. Your healthcare provider may recommend the pneumonia vaccine if you are 65 or older.    COVID-19 vaccines are given to adults as a shot. At least 1 dose of an updated vaccine is recommended for all adults. COVID-19 vaccines are updated throughout the year. Adults 65 or older need a second dose of updated vaccine at least 4 months after the first dose. Your healthcare provider can help you schedule all needed doses as updated vaccines become available.  Prevent Heart Disease   Follow up with your doctor within 72 hours, or as directed: You may need to return for more tests to find the cause of your chest pain. You may be referred to a specialist, such as a cardiologist or gastroenterologist. Write down your questions so you remember to ask them during your visits.

## 2024-10-23 NOTE — ED PROVIDER NOTE - PROGRESS NOTE DETAILS
Patient stable.  Overall much improved.  No current chest pain.  No shortness of breath.  EKG and troponin unremarkable.  Recommend follow-up with primary care doctor and cardiology.  Copies of all labs provided to patient

## 2024-10-23 NOTE — ED ADULT NURSE NOTE - NS ED NOTE  TALK SOMEONE YN
December 1, 2020      Bob Mcdonald MD  [facility]  [facility] [id number]           [facility]  [address] [place] [address]  Phone: [phone]  Fax: [phone]          Patient: Jeimy Campbell   MR Number: [id number]   YOB: 1942   Date of Visit: 12/1/2020       Dear Dr. Bob Mcdonald:    Thank you for referring Jeimy Campbell to me for evaluation. Attached you will find relevant portions of my assessment and plan of care.    If you have questions, please do not hesitate to call me. I look forward to following Jeimy Campbell along with you.    Sincerely,    Juliette L. Sandifer Kum-Nji, MD    Enclosure  CC:  No Recipients    If you would like to receive this communication electronically, please contact [email or web address] or [phone] to request more information on [employer] Link access.    For providers and/or their staff who would like to refer a patient to [facility], please contact us through our one-stop-shop provider referral line, [facility], at [phone].    If you feel you have received this communication in error or would no longer like to receive these types of communications, please e-mail [email or web address]         
No

## 2024-10-23 NOTE — ED PROVIDER NOTE - MUSCULOSKELETAL, MLM
mild diffuse soft tissue tenderness to upper trap and lumbar paraspinals. full ROM. no calf tenderness or swelling

## 2024-10-23 NOTE — ED PROVIDER NOTE - PATIENT PORTAL LINK FT
case management contacted [facility], spoke to Marcella, informed her
that patient is being discharged to home today and will need home health
services resumed, [facility] will resume their services You can access the FollowMyHealth Patient Portal offered by [facility] by registering at the following website: [email or web address]. By joining [facility]’s FollowMyHealth portal, you will also be able to view your health information using other applications (apps) compatible with our system.

## 2024-10-23 NOTE — ED ADULT TRIAGE NOTE - SOURCE OF INFORMATION
Chief Complaint   Patient presents with    Cough    Nasal Congestion         1. \"Have you been to the ER, urgent care clinic since your last visit? Hospitalized since your last visit? \" No    2. \"Have you seen or consulted any other health care providers outside of the [address] [place] [address] since your last visit? \" No     3. For patients aged 39-70: Has the patient had a colonoscopy / FIT/ Cologuard? Yes - no Care Gap present      If the patient is female:    4. For patients aged 41-77: Has the patient had a mammogram within the past 2 years? Yes - no Care Gap present      5. For patients aged 21-65: Has the patient had a pap smear?  NA - based on age or sex         3 most recent PHQ Screens 12/14/2022   Little interest or pleasure in doing things Not at all   Feeling down, depressed, irritable, or hopeless Not at all   Total Score PHQ 2 0       Health Maintenance Due   Topic Date Due    COVID-19 Vaccine (4 - Booster for Moderna series) 12/20/2021    Flu Vaccine (1) 08/01/2022 Patient

## 2024-10-23 NOTE — ED PROVIDER NOTE - DIFFERENTIAL DIAGNOSIS
Differentials include but not limited to chest wall pain, pericarditis, myocarditis, costochondritis, ACS, anxiety reaction, pneumothorax Differential Diagnosis

## 2024-10-24 PROBLEM — E78.5 HYPERLIPIDEMIA, UNSPECIFIED: Chronic | Status: ACTIVE | Noted: 2024-10-23

## 2025-01-05 NOTE — ED PROVIDER NOTE - NS ED ATTENDING STATEMENT MOD
I have personally performed a face to face diagnostic evaluation on this patient. I have reviewed the ACP note and agree with the history, exam and plan of care, except as noted. spontaneous

## 2025-02-25 ENCOUNTER — NON-APPOINTMENT (OUTPATIENT)
Age: 50
End: 2025-02-25

## 2025-05-19 ENCOUNTER — APPOINTMENT (OUTPATIENT)
Dept: RHEUMATOLOGY | Facility: CLINIC | Age: 50
End: 2025-05-19